# Patient Record
Sex: FEMALE | Race: WHITE | NOT HISPANIC OR LATINO | Employment: OTHER | URBAN - METROPOLITAN AREA
[De-identification: names, ages, dates, MRNs, and addresses within clinical notes are randomized per-mention and may not be internally consistent; named-entity substitution may affect disease eponyms.]

---

## 2017-10-28 ENCOUNTER — RADIANT APPOINTMENT (OUTPATIENT)
Dept: GENERAL RADIOLOGY | Facility: CLINIC | Age: 67
End: 2017-10-28
Attending: FAMILY MEDICINE
Payer: COMMERCIAL

## 2017-10-28 ENCOUNTER — OFFICE VISIT (OUTPATIENT)
Dept: URGENT CARE | Facility: URGENT CARE | Age: 67
End: 2017-10-28
Payer: COMMERCIAL

## 2017-10-28 VITALS
BODY MASS INDEX: 23.3 KG/M2 | DIASTOLIC BLOOD PRESSURE: 60 MMHG | TEMPERATURE: 98.7 F | HEART RATE: 73 BPM | RESPIRATION RATE: 16 BRPM | SYSTOLIC BLOOD PRESSURE: 130 MMHG | WEIGHT: 140 LBS

## 2017-10-28 DIAGNOSIS — L03.012 PARONYCHIA OF LEFT THUMB: Primary | ICD-10-CM

## 2017-10-28 DIAGNOSIS — L03.012 PARONYCHIA OF LEFT THUMB: ICD-10-CM

## 2017-10-28 DIAGNOSIS — E11.65 POORLY CONTROLLED TYPE 2 DIABETES MELLITUS (H): ICD-10-CM

## 2017-10-28 PROCEDURE — 99203 OFFICE O/P NEW LOW 30 MIN: CPT | Performed by: FAMILY MEDICINE

## 2017-10-28 PROCEDURE — 73140 X-RAY EXAM OF FINGER(S): CPT | Mod: LT

## 2017-10-28 RX ORDER — ECONAZOLE NITRATE 10 MG/G
CREAM TOPICAL 2 TIMES DAILY
Status: ON HOLD | COMMUNITY
End: 2020-01-23

## 2017-10-28 RX ORDER — PIOGLITAZONEHYDROCHLORIDE 15 MG/1
15 TABLET ORAL DAILY
COMMUNITY

## 2017-10-28 RX ORDER — CLINDAMYCIN HCL 300 MG
300 CAPSULE ORAL 4 TIMES DAILY
Qty: 40 CAPSULE | Refills: 0 | Status: ON HOLD | OUTPATIENT
Start: 2017-10-28 | End: 2020-01-23

## 2017-10-28 NOTE — MR AVS SNAPSHOT
After Visit Summary   10/28/2017    Carie Ambrocio    MRN: [id number]           Patient Information     Date Of Birth          1950        Visit Information        Provider Department      10/28/2017 11:15 AM Amaris Brown MD [facility]        Today's Diagnoses     Paronychia of left thumb    -  1      Care Instructions      Paronychia of the Finger or Toe  Paronychia is an infection near a fingernail or toenail. It usually occurs when an opening in the cuticle or an ingrown toenail lets bacteria under the skin.  The infection will need to be drained if pus is present. If the infection has been caught early, you may need only antibiotic treatment. Healing will take about 1 to 2 weeks.  Home care  Follow these guidelines when caring for yourself at home:    Clean and soak the toe or finger. Do this 2 times a day for the first 3 days. To do so:    Soak your foot or hand in a tub of warm water for 5 minutes. Or hold your toe or finger under a faucet of warm running water for 5 minutes.    Clean any crust away with soap and water using a cotton swab.    Put antibiotic ointment on the infected area.    Change the dressing daily or any time it gets dirty.    If you were given antibiotics, take them as directed until they are all gone.    If your infection is on a toe, wear comfortable shoes with a lot of toe room. You can also wear open-toed sandals while your toe heals.    You may use over-the-counter medicine (acetaminophen or ibuprofen to help with pain, unless another medicine was prescribed. If you have chronic liver or kidney disease, talk with your healthcare provider before using these medicines. Also talk with your provider if you've had a stomach ulcer or GI (gastrointestinal) bleeding.  Prevention  The following can prevent paronychia:    Avoid cutting or playing with your cuticles at home.    Don't bite your nails.    Don't suck on your thumbs or  "fingers.  Follow-up care  Follow up with your healthcare provider, or as advised.  When to seek medical advice  Call your healthcare provider right away if any of these occur:    Redness, pain, or swelling of the finger or toe gets worse    Red streaks in the skin leading away from the wound    Pus or fluid draining from the nail area    Fever of 100.4 F (38 C) or higher, or as directed by your provider  Date Last Reviewed: 8/1/2016 2000-2017 [employer]. [address] [place] [address]. All rights reserved. This information is not intended as a substitute for professional medical care. Always follow your healthcare professional's instructions.                Follow-ups after your visit        Your next 10 appointments already scheduled     Nov 17, 2017 12:30 PM CST   MA SCREENING BILATERAL W/ PATRICK with [id number]   [facility] ([facility])    [address], [address]  [facility] [address]   [phone]           Three-dimensional (3D) mammograms are available at [place] locations in [facility], [facility], [place], [place], and [place]. -Health locations include [facility] in [place]. Benefits of 3D mammograms include: - Improved rate of cancer detection - Decreases your chance of having to go back for more tests, which means fewer: - \"False-positive\" results (This means that there is an abnormal area but it isn't cancer.) - Invasive testing procedures, such as a biopsy or surgery - Can provide clearer images of the breast if you have dense breast tissue. 3D mammography is an optional exam that anyone can have with a 2D mammogram. It doesn't replace or take the place of a 2D mammogram. 2D mammograms remain an effective screening test for all women.  Not all insurance companies cover the cost of a 3D mammogram. Check with your insurance.              Who to contact     If you have " "questions or need follow up information about today's clinic visit or your schedule please contact [facility] directly at [phone].  Normal or non-critical lab and imaging results will be communicated to you by MyChart, letter or phone within 4 business days after the clinic has received the results. If you do not hear from us within 7 days, please contact the clinic through [employer] or phone. If you have a critical or abnormal lab result, we will notify you by phone as soon as possible.  Submit refill requests through [employer] or call your pharmacy and they will forward the refill request to us. Please allow 3 business days for your refill to be completed.          Additional Information About Your Visit        [employer] Information     [employer] lets you send messages to your doctor, view your test results, renew your prescriptions, schedule appointments and more. To sign up, go to [email or web address] . Click on \"Log in\" on the left side of the screen, which will take you to the Welcome page. Then click on \"Sign up Now\" on the right side of the page.     You will be asked to enter the access code listed below, as well as some personal information. Please follow the directions to create your username and password.     Your access code is: [email or web address]  Expires: 2018 12:37 PM     Your access code will  in 90 days. If you need help or a new code, please call your [place] clinic or [phone].        Care EveryWhere ID     This is your Care EveryWhere ID. This could be used by other organizations to access your [place] medical records  [id number]        Your Vitals Were     Pulse Temperature Respirations BMI (Body Mass Index)          73 98.7  F (37.1  C) 16 23.3 kg/m2         Blood Pressure from Last 3 Encounters:   10/28/17 130/60   16 128/65   16 152/80    Weight from Last 3 Encounters:   10/28/17 140 lb (63.5 kg)   14 138 lb (62.6 kg)               "   Today's Medication Changes          These changes are accurate as of: 10/28/17 12:37 PM.  If you have any questions, ask your nurse or doctor.               Start taking these medicines.        Dose/Directions    clindamycin 300 MG capsule   Commonly known as:  CLEOCIN   Used for:  Paronychia of left thumb   Started by:  Amaris Brown MD        Dose:  300 mg   Take 1 capsule (300 mg) by mouth 4 times daily   Quantity:  40 capsule   Refills:  0            Where to get your medicines      These medications were sent to [facility] [address] [facility] [address] [facility] [address] & [facility]  [address] [place] [address] [address], [facility] [address]    Hours:  24-hours Phone:  [phone]     clindamycin 300 MG capsule                Primary Care Provider Office Phone # Fax #    Adan Carty [phone]       [address] [address]        Equal Access to Services     [facility]: Hadii aad ku hadasho Soomaali, waaxda luqadaha, qaybta kaalmada adeegyada, waxay idiin hayaan lanette dinharalady woodard . So [facility] [phone].    ATENCIÓN: Si habla español, tiene a forte disposición servicios gratuitos de asistencia lingüística. [facility] [phone].    We comply with applicable federal civil rights laws and [place] laws. We do not discriminate on the basis of race, color, national origin, age, disability, sex, sexual orientation, or gender identity.            Thank you!     Thank you for choosing [facility]  for your care. Our goal is always to provide you with excellent care. Hearing back from our patients is one way we can continue to improve our services. Please take a few minutes to complete the written survey that you may receive in the mail after your visit with us. Thank you!             Your Updated Medication List - Protect others around you: Learn how to safely use, store and throw away your medicines at  www.disposemymeds.org.          This list is accurate as of: 10/28/17 12:37 PM.  Always use your most recent med list.                   Brand Name Dispense Instructions for use Diagnosis    aspirin 81 MG tablet      Take 1 tablet by mouth daily.        ATENOLOL PO           clindamycin 300 MG capsule    CLEOCIN    40 capsule    Take 1 capsule (300 mg) by mouth 4 times daily    Paronychia of left thumb       econazole nitrate 1 % cream      Apply topically 2 times daily        JANUVIA PO           METFORMIN HCL PO           pioglitazone 15 MG tablet    ACTOS     Take by mouth daily        PREMARIN PO           SIMVASTATIN PO      Take 20 mg by mouth At Bedtime        SYMBICORT IN

## 2017-10-28 NOTE — PATIENT INSTRUCTIONS
Paronychia of the Finger or Toe  Paronychia is an infection near a fingernail or toenail. It usually occurs when an opening in the cuticle or an ingrown toenail lets bacteria under the skin.  The infection will need to be drained if pus is present. If the infection has been caught early, you may need only antibiotic treatment. Healing will take about 1 to 2 weeks.  Home care  Follow these guidelines when caring for yourself at home:    Clean and soak the toe or finger. Do this 2 times a day for the first 3 days. To do so:    Soak your foot or hand in a tub of warm water for 5 minutes. Or hold your toe or finger under a faucet of warm running water for 5 minutes.    Clean any crust away with soap and water using a cotton swab.    Put antibiotic ointment on the infected area.    Change the dressing daily or any time it gets dirty.    If you were given antibiotics, take them as directed until they are all gone.    If your infection is on a toe, wear comfortable shoes with a lot of toe room. You can also wear open-toed sandals while your toe heals.    You may use over-the-counter medicine (acetaminophen or ibuprofen to help with pain, unless another medicine was prescribed. If you have chronic liver or kidney disease, talk with your healthcare provider before using these medicines. Also talk with your provider if you've had a stomach ulcer or GI (gastrointestinal) bleeding.  Prevention  The following can prevent paronychia:    Avoid cutting or playing with your cuticles at home.    Don't bite your nails.    Don't suck on your thumbs or fingers.  Follow-up care  Follow up with your healthcare provider, or as advised.  When to seek medical advice  Call your healthcare provider right away if any of these occur:    Redness, pain, or swelling of the finger or toe gets worse    Red streaks in the skin leading away from the wound    Pus or fluid draining from the nail area    Fever of 100.4 F (38 C) or higher, or as directed  by your provider  Date Last Reviewed: 8/1/2016 2000-2017 [employer]. [address] [place] [address], [place] [address]. All rights reserved. This information is not intended as a substitute for professional medical care. Always follow your healthcare professional's instructions.

## 2017-10-28 NOTE — PROGRESS NOTES
SUBJECTIVE:  Chief Complaint   Patient presents with     Urgent Care     Pt got a manicure on 9/21/17 and got an infection on her left thumb. Pt has seen multiple doctors and has been on two rounds of antibiotics. Pt continues to have pain in her thumb intermittently.     Carie Ambrocio is a 67 year old female who presents complaining of left   hand  first digit pain.  She had a manicure 9/21/17 with an infection that resulted around the nail margin.  She was seen by dermatology and treated with Bactrim 5 days with some improvement.  She followed up with primary care and was given 10 days of bactrim with improvement but not complete resolution,  Was also given antifungal cream to apply to the finger.  She has noted some persistent  redness and swelling along the cuticle margin with local tenderness.  Symptoms began    Severity: mild.  Her dermatologist suggested that the infection may not resolve if there is osteomyelitis-  She would like x-ray to evaluate.   No fevers or chills noted.  No migration of redness or swelling proximally.    Past Medical History:   Diagnosis Date     Type 2 diabetes mellitus without complications (H)      Uncomplicated asthma        ALLERGIES:  Levaquin [levofloxacin] and Penicillins      Current Outpatient Prescriptions on File Prior to Visit:  SIMVASTATIN PO Take 20 mg by mouth At Bedtime   ATENOLOL PO    METFORMIN HCL PO    SitaGLIPtin Phosphate (JANUVIA PO)    Budesonide-Formoterol Fumarate (SYMBICORT IN)    Estrogens Conjugated (PREMARIN PO)    aspirin 81 MG tablet Take 1 tablet by mouth daily.     No current facility-administered medications on file prior to visit.     Social History   Substance Use Topics     Smoking status: Never Smoker     Smokeless tobacco: Not on file     Alcohol use 2.4 oz/week     4 Shots of liquor per week       Family History   Problem Relation Age of Onset     CANCER Father      skin cancder       ROS:  CONSTITUTIONAL:NEGATIVE for fever, chills,  change in weight  EYES: NEGATIVE for vision changes or irritation  ENT/MOUTH: NEGATIVE for ear, mouth and throat problems  RESP:NEGATIVE for significant cough or SOB  GI: NEGATIVE for nausea, abdominal pain, heartburn, or change in bowel habits    OBJECTIVE:  /60  Pulse 73  Temp 98.7  F (37.1  C)  Resp 16  Wt 140 lb (63.5 kg)  BMI 23.3 kg/m2  left Hand  exam:  examination of first digit reveals paronychia with redness, tenderness and swelling along the distal digit at the nail margin.      GENERAL APPEARANCE: healthy, alert and no distress  MS:extremities normal- no gross deformities noted,  FROM noted in all extremities  NEURO: Normal strength and tone, sensory exam grossly normal,  normal speech and mentation  SKIN: no suspicious lesions or rashes      ASSESSMENT:  Paronychia of left thumb     - XR Finger Left G/E 2 Views; Future  - clindamycin (CLEOCIN) 300 MG capsule; Take 1 capsule (300 mg) by mouth 4 times daily       For home care the patient was advised to perform warm water soaks twice daily.    Use peroxide and a cotton swab to clean the creases around the fingernail   Patient was advised that future infections may resolve without antibiotics with good cleaning of the groove at the margin of the nail and with drainage of any purulent material,  pulling the adjacent skin away from the nail.    She should continue the local application of the antifungal cream    Poorly controlled type 2 diabetes mellitus (H)      discussed that he high blood sugars make her ability to fight the infection weaker

## 2017-10-28 NOTE — NURSING NOTE
"Chief Complaint   Patient presents with     Urgent Care     Pt got a manicure on 9/21/17 and got an infection on her left thumb. Pt has seen multiple doctors and has been on two rounds of antibiotics. Pt continues to have pain in her thumb intermittently.       Initial /60  Pulse 73  Temp 98.7  F (37.1  C)  Resp 16  Wt 140 lb (63.5 kg)  BMI 23.3 kg/m2 Estimated body mass index is 23.3 kg/(m^2) as calculated from the following:    Height as of 1/19/14: 5' 5\" (1.651 m).    Weight as of this encounter: 140 lb (63.5 kg).  Medication Reconciliation: complete    "

## 2017-11-17 ENCOUNTER — HOSPITAL ENCOUNTER (OUTPATIENT)
Dept: MAMMOGRAPHY | Facility: CLINIC | Age: 67
Discharge: HOME | End: 2017-11-17
Attending: FAMILY MEDICINE | Admitting: FAMILY MEDICINE
Payer: MEDICARE

## 2017-11-17 DIAGNOSIS — Z12.31 VISIT FOR SCREENING MAMMOGRAM: ICD-10-CM

## 2017-11-17 PROCEDURE — 77063 BREAST TOMOSYNTHESIS BI: CPT

## 2018-04-30 ENCOUNTER — OFFICE VISIT (OUTPATIENT)
Dept: UROLOGY | Facility: CLINIC | Age: 68
End: 2018-04-30
Payer: COMMERCIAL

## 2018-04-30 VITALS — BODY MASS INDEX: 23.32 KG/M2 | HEIGHT: 65 IN | WEIGHT: 140 LBS | OXYGEN SATURATION: 98 % | HEART RATE: 74 BPM

## 2018-04-30 DIAGNOSIS — Z87.442 HISTORY OF KIDNEY STONES: Primary | ICD-10-CM

## 2018-04-30 LAB
ALBUMIN UR-MCNC: 30 MG/DL
APPEARANCE UR: CLEAR
BILIRUB UR QL STRIP: NEGATIVE
COLOR UR AUTO: YELLOW
GLUCOSE UR STRIP-MCNC: 500 MG/DL
HGB UR QL STRIP: ABNORMAL
KETONES UR STRIP-MCNC: ABNORMAL MG/DL
LEUKOCYTE ESTERASE UR QL STRIP: NEGATIVE
NITRATE UR QL: NEGATIVE
PH UR STRIP: 5 PH (ref 5–7)
SOURCE: ABNORMAL
SP GR UR STRIP: >1.03 (ref 1–1.03)
UROBILINOGEN UR STRIP-ACNC: 0.2 EU/DL (ref 0.2–1)

## 2018-04-30 PROCEDURE — 99202 OFFICE O/P NEW SF 15 MIN: CPT | Performed by: UROLOGY

## 2018-04-30 PROCEDURE — 81003 URINALYSIS AUTO W/O SCOPE: CPT | Mod: QW | Performed by: UROLOGY

## 2018-04-30 ASSESSMENT — PAIN SCALES - GENERAL: PAINLEVEL: NO PAIN (0)

## 2018-04-30 NOTE — MR AVS SNAPSHOT
"              After Visit Summary   2018    Carie Ambrocio    MRN: [id number]           Patient Information     Date Of Birth          1950        Visit Information        Provider Department      2018 11:30 AM Larry Dean MD [facility]        Today's Diagnoses     History of kidney stones    -  1       Follow-ups after your visit        Follow-up notes from your care team     Return if symptoms worsen or fail to improve.      Who to contact     If you have questions or need follow up information about today's clinic visit or your schedule please contact [facility] [place] directly at [phone].  Normal or non-critical lab and imaging results will be communicated to you by MyChart, letter or phone within 4 business days after the clinic has received the results. If you do not hear from us within 7 days, please contact the clinic through MyChart or phone. If you have a critical or abnormal lab result, we will notify you by phone as soon as possible.  Submit refill requests through [employer] or call your pharmacy and they will forward the refill request to us. Please allow 3 business days for your refill to be completed.          Additional Information About Your Visit        MyChart Information     [employer] lets you send messages to your doctor, view your test results, renew your prescriptions, schedule appointments and more. To sign up, go to [email or web address] . Click on \"Log in\" on the left side of the screen, which will take you to the Welcome page. Then click on \"Sign up Now\" on the right side of the page.     You will be asked to enter the access code listed below, as well as some personal information. Please follow the directions to create your username and password.     Your access code is: [email or web address]  Expires: 2018 11:56 AM     Your access code will  in 90 days. If you need " "help or a new code, please call your [facility] or [phone].        Care EveryWhere ID     This is your Care EveryWhere ID. This could be used by other organizations to access your [place] medical records  [id number]        Your Vitals Were     Pulse Height Pulse Oximetry BMI (Body Mass Index)          74 1.651 m (5' 5\") 98% 23.3 kg/m2         Blood Pressure from Last 3 Encounters:   10/28/17 130/60   05/28/16 128/65   01/31/16 152/80    Weight from Last 3 Encounters:   04/30/18 63.5 kg (140 lb)   10/28/17 63.5 kg (140 lb)   01/19/14 62.6 kg (138 lb)              We Performed the Following     UA without Microscopic        Primary Care Provider Office Phone # Fax #    Adan Carty [phone]       [facility] [address] [place] [address]        Equal Access to Services     [facility]: Hadii aad ku hadasho Soomaali, waaxda luqadaha, qaybta kaalmada adeegyada, waxay idiin haytavonn lanette woodard . So [facility] [phone].    ATENCIÓN: Si habla español, tiene a forte disposición servicios gratuitos de asistencia lingüística. Llame al [phone].    We comply with applicable federal civil rights laws and [place] laws. We do not discriminate on the basis of race, color, national origin, age, disability, sex, sexual orientation, or gender identity.            Thank you!     Thank you for choosing [facility] [place]  for your care. Our goal is always to provide you with excellent care. Hearing back from our patients is one way we can continue to improve our services. Please take a few minutes to complete the written survey that you may receive in the mail after your visit with us. Thank you!             Your Updated Medication List - Protect others around you: Learn how to safely use, store and throw away your medicines at www.disposemymeds.org.          This list is accurate as of 4/30/18 11:56 AM.  Always use your " most recent med list.                   Brand Name Dispense Instructions for use Diagnosis    aspirin 81 MG tablet      Take 1 tablet by mouth daily.        ATENOLOL PO           clindamycin 300 MG capsule    CLEOCIN    40 capsule    Take 1 capsule (300 mg) by mouth 4 times daily    Paronychia of left thumb       econazole nitrate 1 % cream      Apply topically 2 times daily        JANUMET  MG per tablet   Generic drug:  sitagliptin-metFORMIN      Take 1 tablet by mouth daily        JANUVIA PO           METFORMIN HCL PO           pioglitazone 15 MG tablet    ACTOS     Take by mouth daily        PREMARIN PO           SIMVASTATIN PO      Take 20 mg by mouth At Bedtime        SYMBICORT IN

## 2018-04-30 NOTE — NURSING NOTE
[facility] [place]  KUB is in care everywhere.  Pt has crystals in her urine.  Pt states her urine in the past has smelt like metal.  Pt wants to talk to RJ Mckinley, CMA

## 2018-04-30 NOTE — PROGRESS NOTES
[facility]  Main Office: [address]  [address]  [place] [address]       CHIEF COMPLAINT:   History of kidney stones and hyperdense left renal cyst    HISTORY:   This is a 67-year-old woman who is establishing care today because she has a history of kidney stones. She has had 2 kidney stones that she was able to pass. It happens 25 and 20 years ago. She has had no problems over the past 20 years. She has had no prior urologic surgeries, both of her stones passed. She did have a CT scan performed in 2016 that showed a hyperdense renal cyst on the left side, a follow-up MRI was performed at [facility] that showed no concerns for a solid renal mass. Her most recent imaging was last month when she had a KUB also at [facility]. The KUB was negative showing no evidence of recurrent stones.      PAST MEDICAL HISTORY:   Past Medical History:   Diagnosis Date     Type 2 diabetes mellitus without complications (H)      Uncomplicated asthma        PAST SURGICAL HISTORY:   No past surgical history on file.    FAMILY HISTORY:   Family History   Problem Relation Age of Onset     CANCER Father      skin cancder       SOCIAL HISTORY:   Social History   Substance Use Topics     Smoking status: Never Smoker     Smokeless tobacco: Not on file     Alcohol use 2.4 oz/week     4 Shots of liquor per week        ALLERGIES:  Allergies   Allergen Reactions     Levaquin [Levofloxacin]      Penicillins        MEDICATIONS:     Current Outpatient Prescriptions:      aspirin 81 MG tablet, Take 1 tablet by mouth daily., Disp: , Rfl: 3     ATENOLOL PO, , Disp: , Rfl:      Budesonide-Formoterol Fumarate (SYMBICORT IN), , Disp: , Rfl:      clindamycin (CLEOCIN) 300 MG capsule, Take 1 capsule (300 mg) by mouth 4 times daily, Disp: 40 capsule, Rfl: 0     econazole nitrate 1 % cream, Apply topically 2 times daily, Disp: , Rfl:      Estrogens Conjugated (PREMARIN PO), , Disp: , Rfl:      METFORMIN HCL PO, , Disp: , Rfl:      pioglitazone  (ACTOS) 15 MG tablet, Take by mouth daily, Disp: , Rfl:      SIMVASTATIN PO, Take 20 mg by mouth At Bedtime, Disp: , Rfl:      SitaGLIPtin Phosphate (JANUVIA PO), , Disp: , Rfl:     REVIEW OF SYSTEMS:  Allergic/Immunologic: negative  Constitutional Symptoms: negative   Fever: none   Weight loss: none   Other: none  Hematologic/Lymphatic: negative  Integumentary: negative   Breast: negative   Hair: negative   Skin: negative   Skin: negative  Eyes: negative  Ears/Nose/Throat: negative  Respiratory: negative  Cardiovascular: negative  Gastrointestinal: negative  Genitourinary: negative  Musculoskeletal: negative  Neurologic: negative  Psychiatric: negative  Reproductive System: negative  Endocrine: negative      PHYSICAL EXAM:  VS: HR: Data Unavailable    BP: Data Unavailable      WT: 0 lbs 0 oz       HT: Data Unavailable    General appearance: In NAD, conversant  HEENT: normocephalic and atraumatic, anicteric sclera  Lymph Nodes: not examined  Cardiovascular: not examined  Respiratory: normal, non-labored breathing  Abdomen: soft, non-tender, and non-distended  Back/Flank: not examined  Peripheral Vascular/extremity: no peripheral edema  Lymphatic: not examined  Skin: Normal temperature, turgor, and texture. No rash  Psychiatric: Appropriate affect. Alert and Oriented to person, place, and time    Pelvic:    External genitalia: not examined   Urethra: not examined   Vagina: not examined      Cystoscopy:     Laboratory Studies:     Imaging Studies:       CLINICAL IMPRESSION:   History of kidney stones    PLAN:   We discussed prevention of future stones. All of her questions were answered. A recent KUB showed no evidence of stone so no current intervention is required. She will follow-up with me as needed future.      Larry Dean M.D.

## 2018-11-21 ENCOUNTER — HOSPITAL ENCOUNTER (OUTPATIENT)
Dept: MAMMOGRAPHY | Facility: CLINIC | Age: 68
Discharge: HOME | End: 2018-11-21
Attending: OBSTETRICS & GYNECOLOGY | Admitting: OBSTETRICS & GYNECOLOGY
Payer: MEDICARE

## 2018-11-21 ENCOUNTER — HOSPITAL ENCOUNTER (OUTPATIENT)
Dept: MAMMOGRAPHY | Facility: CLINIC | Age: 68
End: 2018-11-21
Attending: OBSTETRICS & GYNECOLOGY
Payer: MEDICARE

## 2018-11-21 DIAGNOSIS — R92.8 ABNORMAL MAMMOGRAM: ICD-10-CM

## 2018-11-21 PROCEDURE — 77066 DX MAMMO INCL CAD BI: CPT

## 2018-11-21 PROCEDURE — 76642 ULTRASOUND BREAST LIMITED: CPT | Mod: RT

## 2018-12-27 NOTE — LETTER
4/30/2018       RE: Carie Ambrocio  [address] [place] [address]     Dear Colleague,    Thank you for referring your patient, Carie Ambrocio, to the [facility] [place] at [facility]. Please see a copy of my visit note below.    [facility]  Main Office: [address]  [address]  [place] [address]       CHIEF COMPLAINT:   History of kidney stones and hyperdense left renal cyst    HISTORY:   This is a 67-year-old woman who is establishing care today because she has a history of kidney stones. She has had 2 kidney stones that she was able to pass. It happens 25 and 20 years ago. She has had no problems over the past 20 years. She has had no prior urologic surgeries, both of her stones passed. She did have a CT scan performed in 2016 that showed a hyperdense renal cyst on the left side, a follow-up MRI was performed at [facility] that showed no concerns for a solid renal mass. Her most recent imaging was last month when she had a KUB also at [facility]. The KUB was negative showing no evidence of recurrent stones.      PAST MEDICAL HISTORY:   Past Medical History:   Diagnosis Date     Type 2 diabetes mellitus without complications (H)      Uncomplicated asthma        PAST SURGICAL HISTORY:   No past surgical history on file.    FAMILY HISTORY:   Family History   Problem Relation Age of Onset     CANCER Father      skin cancder       SOCIAL HISTORY:   Social History   Substance Use Topics     Smoking status: Never Smoker     Smokeless tobacco: Not on file     Alcohol use 2.4 oz/week     4 Shots of liquor per week        ALLERGIES:  Allergies   Allergen Reactions     Levaquin [Levofloxacin]      Penicillins        MEDICATIONS:     Current Outpatient Prescriptions:      aspirin 81 MG tablet, Take 1 tablet by mouth daily., Disp: , Rfl: 3     ATENOLOL PO, , Disp: , Rfl:      Budesonide-Formoterol Fumarate (SYMBICORT IN), ,  Patient called c/o cough, congestion x 5 days. Patient has tried muccinex and that thinned the mucous out but cough persists (worse at night). Patient requesting appt to be seen or prescription for cough.       [facility] [address] [place] [address] -  [phone] [[email or web address] [id number]]   [phone] Disp: , Rfl:      clindamycin (CLEOCIN) 300 MG capsule, Take 1 capsule (300 mg) by mouth 4 times daily, Disp: 40 capsule, Rfl: 0     econazole nitrate 1 % cream, Apply topically 2 times daily, Disp: , Rfl:      Estrogens Conjugated (PREMARIN PO), , Disp: , Rfl:      METFORMIN HCL PO, , Disp: , Rfl:      pioglitazone (ACTOS) 15 MG tablet, Take by mouth daily, Disp: , Rfl:      SIMVASTATIN PO, Take 20 mg by mouth At Bedtime, Disp: , Rfl:      SitaGLIPtin Phosphate (JANUVIA PO), , Disp: , Rfl:     REVIEW OF SYSTEMS:  Allergic/Immunologic: negative  Constitutional Symptoms: negative   Fever: none   Weight loss: none   Other: none  Hematologic/Lymphatic: negative  Integumentary: negative   Breast: negative   Hair: negative   Skin: negative   Skin: negative  Eyes: negative  Ears/Nose/Throat: negative  Respiratory: negative  Cardiovascular: negative  Gastrointestinal: negative  Genitourinary: negative  Musculoskeletal: negative  Neurologic: negative  Psychiatric: negative  Reproductive System: negative  Endocrine: negative      PHYSICAL EXAM:  VS: HR: Data Unavailable    BP: Data Unavailable      WT: 0 lbs 0 oz       HT: Data Unavailable    General appearance: In NAD, conversant  HEENT: normocephalic and atraumatic, anicteric sclera  Lymph Nodes: not examined  Cardiovascular: not examined  Respiratory: normal, non-labored breathing  Abdomen: soft, non-tender, and non-distended  Back/Flank: not examined  Peripheral Vascular/extremity: no peripheral edema  Lymphatic: not examined  Skin: Normal temperature, turgor, and texture. No rash  Psychiatric: Appropriate affect. Alert and Oriented to person, place, and time    Pelvic:    External genitalia: not examined   Urethra: not examined   Vagina: not examined      Cystoscopy:     Laboratory Studies:     Imaging Studies:       CLINICAL IMPRESSION:   History of kidney stones    PLAN:   We discussed prevention of future stones. All of her questions were answered. A recent KUB  showed no evidence of stone so no current intervention is required. She will follow-up with me as needed future.      Again, thank you for allowing me to participate in the care of your patient.      Sincerely,    Larry Dean MD

## 2019-06-08 ENCOUNTER — OFFICE VISIT (OUTPATIENT)
Dept: URGENT CARE | Facility: URGENT CARE | Age: 69
End: 2019-06-08
Payer: COMMERCIAL

## 2019-06-08 VITALS
SYSTOLIC BLOOD PRESSURE: 130 MMHG | HEIGHT: 65 IN | WEIGHT: 141 LBS | TEMPERATURE: 98.1 F | DIASTOLIC BLOOD PRESSURE: 70 MMHG | HEART RATE: 88 BPM | RESPIRATION RATE: 16 BRPM | BODY MASS INDEX: 23.49 KG/M2

## 2019-06-08 DIAGNOSIS — R30.0 DYSURIA: Primary | ICD-10-CM

## 2019-06-08 LAB
ALBUMIN UR-MCNC: 30 MG/DL
APPEARANCE UR: CLEAR
BACTERIA #/AREA URNS HPF: ABNORMAL /HPF
BILIRUB UR QL STRIP: ABNORMAL
CAOX CRY #/AREA URNS HPF: ABNORMAL /HPF
COLOR UR AUTO: YELLOW
GLUCOSE UR STRIP-MCNC: 250 MG/DL
HGB UR QL STRIP: NEGATIVE
KETONES UR STRIP-MCNC: ABNORMAL MG/DL
LEUKOCYTE ESTERASE UR QL STRIP: NEGATIVE
NITRATE UR QL: NEGATIVE
NON-SQ EPI CELLS #/AREA URNS LPF: ABNORMAL /LPF
PH UR STRIP: 5.5 PH (ref 5–7)
RBC #/AREA URNS AUTO: ABNORMAL /HPF
SOURCE: ABNORMAL
SP GR UR STRIP: >1.03 (ref 1–1.03)
UROBILINOGEN UR STRIP-ACNC: 0.2 EU/DL (ref 0.2–1)
WBC #/AREA URNS AUTO: ABNORMAL /HPF

## 2019-06-08 PROCEDURE — 99213 OFFICE O/P EST LOW 20 MIN: CPT | Performed by: PEDIATRICS

## 2019-06-08 PROCEDURE — 87086 URINE CULTURE/COLONY COUNT: CPT | Performed by: PEDIATRICS

## 2019-06-08 PROCEDURE — 81001 URINALYSIS AUTO W/SCOPE: CPT | Performed by: PEDIATRICS

## 2019-06-08 RX ORDER — PHENAZOPYRIDINE HYDROCHLORIDE 100 MG/1
100 TABLET, FILM COATED ORAL 3 TIMES DAILY PRN
Qty: 9 TABLET | Refills: 0 | Status: SHIPPED | OUTPATIENT
Start: 2019-06-08 | End: 2019-06-11

## 2019-06-08 ASSESSMENT — MIFFLIN-ST. JEOR: SCORE: 1165.45

## 2019-06-08 NOTE — PROGRESS NOTES
"SUBJECTIVE:   Carie Ambrocio is a 69 year old female who  presents today for a possible UTI. Symptoms of urgency, frequency and burning have been going on for 2hour(s).  Hematuria no.  sudden onsetand mild.  There is no history of fever, chills, nausea or vomiting.  No history of vaginal or penile discharge. This patient does have a history of urinary tract infections. Patient denies flank pain, temperature > 101 degrees F. and Vomiting, significant nausea or diarrhea or vaginal discharge     Past Medical History:   Diagnosis Date     Mumps      Spider veins      Type 2 diabetes mellitus without complications (H)      Uncomplicated asthma      Current Outpatient Medications   Medication Sig Dispense Refill     aspirin 81 MG tablet Take 1 tablet by mouth daily.  3     ATENOLOL PO        Budesonide-Formoterol Fumarate (SYMBICORT IN)        Estrogens Conjugated (PREMARIN PO)        phenazopyridine (PYRIDIUM) 100 MG tablet Take 1 tablet (100 mg) by mouth 3 times daily as needed for urinary tract discomfort 9 tablet 0     pioglitazone (ACTOS) 15 MG tablet Take by mouth daily       SIMVASTATIN PO Take 20 mg by mouth At Bedtime       sitagliptin-metFORMIN (JANUMET)  MG per tablet Take 1 tablet by mouth daily       clindamycin (CLEOCIN) 300 MG capsule Take 1 capsule (300 mg) by mouth 4 times daily (Patient not taking: Reported on 4/30/2018) 40 capsule 0     econazole nitrate 1 % cream Apply topically 2 times daily       METFORMIN HCL PO        SitaGLIPtin Phosphate (JANUVIA PO)        Social History     Tobacco Use     Smoking status: Never Smoker     Smokeless tobacco: Never Used   Substance Use Topics     Alcohol use: Yes     Alcohol/week: 2.4 oz     Types: 4 Shots of liquor per week       ROS:   Review of systems negative except as stated above.    OBJECTIVE:  /70 (Cuff Size: Adult Regular)   Pulse 88   Temp 98.1  F (36.7  C) (Oral)   Resp 16   Ht 1.651 m (5' 5\")   Wt 64 kg (141 lb)   BMI 23.46 " kg/m    GENERAL APPEARANCE: healthy, alert and no distress  RESP: lungs clear to auscultation - no rales, rhonchi or wheezes  CV: regular rates and rhythm, normal S1 S2, no murmur noted  BACK: No CVA tenderness  SKIN: no suspicious lesions or rashes    ASSESSMENT:   Dysuria. UA negative. Ucx added on.    PLAN:  As per ordered above  Drink plenty of fluids.  Prevention and treatment of UTI's discussed.Signs and symptoms of pyelonephritis mentioned.  Given meds for symptom management. Explained to patient that her culture results will help guide further management. If still having symptoms and culture negative, may need a swab to check for yeast (given high glucose in the urine).    Moonlighter

## 2019-06-11 LAB
BACTERIA SPEC CULT: NORMAL
SPECIMEN SOURCE: NORMAL

## 2019-11-21 ENCOUNTER — HOSPITAL ENCOUNTER (OUTPATIENT)
Dept: MAMMOGRAPHY | Facility: CLINIC | Age: 69
Discharge: HOME | End: 2019-11-21
Attending: OBSTETRICS & GYNECOLOGY | Admitting: OBSTETRICS & GYNECOLOGY
Payer: COMMERCIAL

## 2019-11-21 DIAGNOSIS — Z12.31 VISIT FOR SCREENING MAMMOGRAM: ICD-10-CM

## 2019-11-21 PROCEDURE — 77063 BREAST TOMOSYNTHESIS BI: CPT

## 2019-12-11 ENCOUNTER — ANCILLARY PROCEDURE (OUTPATIENT)
Dept: BONE DENSITY | Facility: CLINIC | Age: 69
End: 2019-12-11
Attending: FAMILY MEDICINE
Payer: COMMERCIAL

## 2019-12-11 DIAGNOSIS — M81.0 OSTEOPOROSIS, UNSPECIFIED OSTEOPOROSIS TYPE, UNSPECIFIED PATHOLOGICAL FRACTURE PRESENCE: ICD-10-CM

## 2019-12-11 PROCEDURE — 77080 DXA BONE DENSITY AXIAL: CPT | Performed by: INTERNAL MEDICINE

## 2020-01-22 RX ORDER — SUMATRIPTAN 100 MG/1
100 TABLET, FILM COATED ORAL
COMMUNITY

## 2020-01-22 RX ORDER — GUAIFENESIN AND DEXTROMETHORPHAN HYDROBROMIDE 100; 10 MG/5ML; MG/5ML
10 SOLUTION ORAL EVERY 4 HOURS PRN
Status: ON HOLD | COMMUNITY
End: 2020-01-23

## 2020-01-22 RX ORDER — ALBUTEROL SULFATE 90 UG/1
2 AEROSOL, METERED RESPIRATORY (INHALATION) 4 TIMES DAILY PRN
COMMUNITY

## 2020-01-22 RX ORDER — VALACYCLOVIR HYDROCHLORIDE 500 MG/1
1000 TABLET, FILM COATED ORAL 2 TIMES DAILY
COMMUNITY

## 2020-01-22 RX ORDER — PSEUDOEPHEDRINE HCL 30 MG
1 TABLET ORAL DAILY
COMMUNITY

## 2020-01-22 RX ORDER — BIMATOPROST 3 UG/ML
1 SOLUTION TOPICAL EVERY OTHER DAY
COMMUNITY

## 2020-01-22 RX ORDER — CELECOXIB 100 MG/1
100 CAPSULE ORAL 2 TIMES DAILY
Status: ON HOLD | COMMUNITY
End: 2020-01-23

## 2020-01-22 RX ORDER — INSULIN LISPRO 100 [IU]/ML
1-3 INJECTION, SOLUTION INTRAVENOUS; SUBCUTANEOUS 2 TIMES DAILY PRN
COMMUNITY

## 2020-01-23 ENCOUNTER — HOSPITAL ENCOUNTER (OUTPATIENT)
Facility: CLINIC | Age: 70
Discharge: HOME | End: 2020-01-23
Attending: OPHTHALMOLOGY | Admitting: OPHTHALMOLOGY
Payer: COMMERCIAL

## 2020-01-23 ENCOUNTER — ANESTHESIA EVENT (OUTPATIENT)
Dept: SURGERY | Facility: CLINIC | Age: 70
End: 2020-01-23
Payer: COMMERCIAL

## 2020-01-23 ENCOUNTER — ANESTHESIA (OUTPATIENT)
Dept: SURGERY | Facility: CLINIC | Age: 70
End: 2020-01-23
Payer: COMMERCIAL

## 2020-01-23 VITALS
OXYGEN SATURATION: 97 % | BODY MASS INDEX: 24.31 KG/M2 | DIASTOLIC BLOOD PRESSURE: 69 MMHG | HEIGHT: 64 IN | SYSTOLIC BLOOD PRESSURE: 141 MMHG | RESPIRATION RATE: 12 BRPM | HEART RATE: 68 BPM | WEIGHT: 142.4 LBS | TEMPERATURE: 97.6 F

## 2020-01-23 DIAGNOSIS — Z98.890 POSTOPERATIVE EYE STATE: Primary | ICD-10-CM

## 2020-01-23 LAB
GLUCOSE BLDC GLUCOMTR-MCNC: 143 MG/DL (ref 70–99)
GLUCOSE BLDC GLUCOMTR-MCNC: 149 MG/DL (ref 70–99)

## 2020-01-23 PROCEDURE — 27210794 ZZH OR GENERAL SUPPLY STERILE: Performed by: OPHTHALMOLOGY

## 2020-01-23 PROCEDURE — 25000125 ZZHC RX 250: Performed by: OPHTHALMOLOGY

## 2020-01-23 PROCEDURE — 40000170 ZZH STATISTIC PRE-PROCEDURE ASSESSMENT II: Performed by: OPHTHALMOLOGY

## 2020-01-23 PROCEDURE — 36000056 ZZH SURGERY LEVEL 3 1ST 30 MIN: Performed by: OPHTHALMOLOGY

## 2020-01-23 PROCEDURE — 25000125 ZZHC RX 250: Performed by: NURSE ANESTHETIST, CERTIFIED REGISTERED

## 2020-01-23 PROCEDURE — 37000008 ZZH ANESTHESIA TECHNICAL FEE, 1ST 30 MIN: Performed by: OPHTHALMOLOGY

## 2020-01-23 PROCEDURE — 71000012 ZZH RECOVERY PHASE 1 LEVEL 1 FIRST HR: Performed by: OPHTHALMOLOGY

## 2020-01-23 PROCEDURE — 25000128 H RX IP 250 OP 636: Performed by: NURSE ANESTHETIST, CERTIFIED REGISTERED

## 2020-01-23 PROCEDURE — 25800030 ZZH RX IP 258 OP 636: Performed by: NURSE ANESTHETIST, CERTIFIED REGISTERED

## 2020-01-23 PROCEDURE — 71000027 ZZH RECOVERY PHASE 2 EACH 15 MINS: Performed by: OPHTHALMOLOGY

## 2020-01-23 PROCEDURE — 37000009 ZZH ANESTHESIA TECHNICAL FEE, EACH ADDTL 15 MIN: Performed by: OPHTHALMOLOGY

## 2020-01-23 PROCEDURE — 82962 GLUCOSE BLOOD TEST: CPT

## 2020-01-23 RX ORDER — ERYTHROMYCIN 5 MG/G
OINTMENT OPHTHALMIC
Status: DISCONTINUED
Start: 2020-01-23 | End: 2020-01-23 | Stop reason: HOSPADM

## 2020-01-23 RX ORDER — NALOXONE HYDROCHLORIDE 0.4 MG/ML
.1-.4 INJECTION, SOLUTION INTRAMUSCULAR; INTRAVENOUS; SUBCUTANEOUS
Status: DISCONTINUED | OUTPATIENT
Start: 2020-01-23 | End: 2020-01-23 | Stop reason: HOSPADM

## 2020-01-23 RX ORDER — OXYCODONE HYDROCHLORIDE 5 MG/1
5 TABLET ORAL EVERY 4 HOURS PRN
Qty: 6 TABLET | Refills: 0 | Status: SHIPPED | OUTPATIENT
Start: 2020-01-23

## 2020-01-23 RX ORDER — ONDANSETRON 2 MG/ML
4 INJECTION INTRAMUSCULAR; INTRAVENOUS EVERY 30 MIN PRN
Status: DISCONTINUED | OUTPATIENT
Start: 2020-01-23 | End: 2020-01-23 | Stop reason: HOSPADM

## 2020-01-23 RX ORDER — SODIUM CHLORIDE, SODIUM LACTATE, POTASSIUM CHLORIDE, CALCIUM CHLORIDE 600; 310; 30; 20 MG/100ML; MG/100ML; MG/100ML; MG/100ML
INJECTION, SOLUTION INTRAVENOUS CONTINUOUS PRN
Status: DISCONTINUED | OUTPATIENT
Start: 2020-01-23 | End: 2020-01-23

## 2020-01-23 RX ORDER — PROPOFOL 10 MG/ML
INJECTION, EMULSION INTRAVENOUS PRN
Status: DISCONTINUED | OUTPATIENT
Start: 2020-01-23 | End: 2020-01-23

## 2020-01-23 RX ORDER — ESTRADIOL 0.05 MG/D
1 PATCH, EXTENDED RELEASE TRANSDERMAL WEEKLY
COMMUNITY

## 2020-01-23 RX ORDER — ERYTHROMYCIN 5 MG/G
OINTMENT OPHTHALMIC
Qty: 3.5 G | Refills: 0 | Status: SHIPPED | OUTPATIENT
Start: 2020-01-23

## 2020-01-23 RX ORDER — LIDOCAINE HYDROCHLORIDE AND EPINEPHRINE BITARTRATE 20; .01 MG/ML; MG/ML
INJECTION, SOLUTION SUBCUTANEOUS
Status: DISCONTINUED
Start: 2020-01-23 | End: 2020-01-23 | Stop reason: HOSPADM

## 2020-01-23 RX ORDER — ONDANSETRON 2 MG/ML
INJECTION INTRAMUSCULAR; INTRAVENOUS PRN
Status: DISCONTINUED | OUTPATIENT
Start: 2020-01-23 | End: 2020-01-23

## 2020-01-23 RX ORDER — TETRACAINE HYDROCHLORIDE 5 MG/ML
SOLUTION OPHTHALMIC PRN
Status: DISCONTINUED | OUTPATIENT
Start: 2020-01-23 | End: 2020-01-23 | Stop reason: HOSPADM

## 2020-01-23 RX ORDER — MEPERIDINE HYDROCHLORIDE 25 MG/ML
12.5 INJECTION INTRAMUSCULAR; INTRAVENOUS; SUBCUTANEOUS
Status: DISCONTINUED | OUTPATIENT
Start: 2020-01-23 | End: 2020-01-23 | Stop reason: HOSPADM

## 2020-01-23 RX ORDER — HYDROMORPHONE HYDROCHLORIDE 1 MG/ML
.3-.5 INJECTION, SOLUTION INTRAMUSCULAR; INTRAVENOUS; SUBCUTANEOUS EVERY 10 MIN PRN
Status: DISCONTINUED | OUTPATIENT
Start: 2020-01-23 | End: 2020-01-23 | Stop reason: HOSPADM

## 2020-01-23 RX ORDER — ONDANSETRON 4 MG/1
4 TABLET, ORALLY DISINTEGRATING ORAL EVERY 30 MIN PRN
Status: DISCONTINUED | OUTPATIENT
Start: 2020-01-23 | End: 2020-01-23 | Stop reason: HOSPADM

## 2020-01-23 RX ORDER — FENTANYL CITRATE 50 UG/ML
INJECTION, SOLUTION INTRAMUSCULAR; INTRAVENOUS PRN
Status: DISCONTINUED | OUTPATIENT
Start: 2020-01-23 | End: 2020-01-23

## 2020-01-23 RX ORDER — LIDOCAINE HYDROCHLORIDE 20 MG/ML
INJECTION, SOLUTION INFILTRATION; PERINEURAL PRN
Status: DISCONTINUED | OUTPATIENT
Start: 2020-01-23 | End: 2020-01-23

## 2020-01-23 RX ORDER — PROPOFOL 10 MG/ML
INJECTION, EMULSION INTRAVENOUS CONTINUOUS PRN
Status: DISCONTINUED | OUTPATIENT
Start: 2020-01-23 | End: 2020-01-23

## 2020-01-23 RX ORDER — SODIUM CHLORIDE, SODIUM LACTATE, POTASSIUM CHLORIDE, CALCIUM CHLORIDE 600; 310; 30; 20 MG/100ML; MG/100ML; MG/100ML; MG/100ML
INJECTION, SOLUTION INTRAVENOUS CONTINUOUS
Status: DISCONTINUED | OUTPATIENT
Start: 2020-01-23 | End: 2020-01-23 | Stop reason: HOSPADM

## 2020-01-23 RX ORDER — FENTANYL CITRATE 50 UG/ML
25-50 INJECTION, SOLUTION INTRAMUSCULAR; INTRAVENOUS
Status: DISCONTINUED | OUTPATIENT
Start: 2020-01-23 | End: 2020-01-23 | Stop reason: HOSPADM

## 2020-01-23 RX ORDER — FENTANYL CITRATE 0.05 MG/ML
25-50 INJECTION, SOLUTION INTRAMUSCULAR; INTRAVENOUS
Status: DISCONTINUED | OUTPATIENT
Start: 2020-01-23 | End: 2020-01-23 | Stop reason: HOSPADM

## 2020-01-23 RX ORDER — FENTANYL CITRATE 50 UG/ML
50-100 INJECTION, SOLUTION INTRAMUSCULAR; INTRAVENOUS
Status: DISCONTINUED | OUTPATIENT
Start: 2020-01-23 | End: 2020-01-23 | Stop reason: HOSPADM

## 2020-01-23 RX ORDER — TETRACAINE HYDROCHLORIDE 5 MG/ML
SOLUTION OPHTHALMIC
Status: DISCONTINUED
Start: 2020-01-23 | End: 2020-01-23 | Stop reason: HOSPADM

## 2020-01-23 RX ORDER — MULTIVITAMIN,THERAPEUTIC
1 TABLET ORAL DAILY
COMMUNITY

## 2020-01-23 RX ORDER — ERYTHROMYCIN 5 MG/G
OINTMENT OPHTHALMIC PRN
Status: DISCONTINUED | OUTPATIENT
Start: 2020-01-23 | End: 2020-01-23 | Stop reason: HOSPADM

## 2020-01-23 RX ADMIN — LIDOCAINE HYDROCHLORIDE 40 MG: 20 INJECTION, SOLUTION INFILTRATION; PERINEURAL at 10:37

## 2020-01-23 RX ADMIN — FENTANYL CITRATE 25 MCG: 50 INJECTION, SOLUTION INTRAMUSCULAR; INTRAVENOUS at 10:44

## 2020-01-23 RX ADMIN — PROPOFOL 20 MG: 10 INJECTION, EMULSION INTRAVENOUS at 10:38

## 2020-01-23 RX ADMIN — ONDANSETRON 4 MG: 2 INJECTION INTRAMUSCULAR; INTRAVENOUS at 10:40

## 2020-01-23 RX ADMIN — SODIUM CHLORIDE, POTASSIUM CHLORIDE, SODIUM LACTATE AND CALCIUM CHLORIDE: 600; 310; 30; 20 INJECTION, SOLUTION INTRAVENOUS at 10:36

## 2020-01-23 RX ADMIN — MIDAZOLAM 2 MG: 1 INJECTION INTRAMUSCULAR; INTRAVENOUS at 10:36

## 2020-01-23 RX ADMIN — FENTANYL CITRATE 50 MCG: 50 INJECTION, SOLUTION INTRAMUSCULAR; INTRAVENOUS at 10:37

## 2020-01-23 RX ADMIN — FENTANYL CITRATE 25 MCG: 50 INJECTION, SOLUTION INTRAMUSCULAR; INTRAVENOUS at 10:54

## 2020-01-23 RX ADMIN — PROPOFOL 100 MCG/KG/MIN: 10 INJECTION, EMULSION INTRAVENOUS at 10:41

## 2020-01-23 RX ADMIN — PROPOFOL 20 MG: 10 INJECTION, EMULSION INTRAVENOUS at 10:41

## 2020-01-23 ASSESSMENT — LIFESTYLE VARIABLES: TOBACCO_USE: 0

## 2020-01-23 ASSESSMENT — MIFFLIN-ST. JEOR: SCORE: 1155.92

## 2020-01-23 ASSESSMENT — COPD QUESTIONNAIRES: COPD: 0

## 2020-01-23 NOTE — OP NOTE
PREOPERATIVE DIAGNOSIS: Bilateral upper eyelid dermatochalasis.   POSTOPERATIVE DIAGNOSIS: Bilateral upper eyelid dermatochalasis.   PROCEDURE: Bilateral upper blepharoplasty.   SURGEON: Lavell Diallo MD.   ASSISTANT: Fatih Olmedo MD   ANESTHESIA: Monitored with local infiltration of 2% lidocaine with epinephrine.   COMPLICATIONS: None.   ESTIMATED BLOOD LOSS: Less than 5 mL.   HISTORY: Carie Ambrocio  presented with upper lid dermatochalasis leading to mechanical ptosis of the upper lids, blocking the superior visual field and interfering with  activities of daily living. After the risks, benefits and alternatives to the proposed procedure were explained, informed consent was obtained.   DESCRIPTION OF PROCEDURE: Carie Ambrocio was brought to the operating room and placed supine on the operating table. IV sedation was given. The upper lid crease and excess upper eyelid skin was marked with marking pen and infiltrated with local anesthetic. The area was prepped and draped in the typical fashion. Attention was directed to the right side. Skin was incised following marked lines. Skin flap was excised with a high temperature cautery. A row of cautery was placed in the orbicularis along the inferior incision line.   The orbicularis and septum were opened nasally. A small amount of the nasal fat pad was excised with the cautery. The orbicularis was divided laterally with the cautery. Multiple 5-0 Monocryl sutures were used to secure the superior edge of orbicularis to the arcus marginalis to support the lateral brow.Hemostasis was obtained and the skin closed with running 6-0 plain gut suture. Attention was directed to the left side where the same procedure was performed.  Ophthalmic antibiotic ointment was applied to the eyelids and into the eyes. Carie Ambrocio tolerated the procedure well and left the operating room in stable condition.     Lavell Diallo MD

## 2020-01-23 NOTE — BRIEF OP NOTE
[facility] Brief Operative Note    Pre-operative diagnosis: Dermatochalasis of both upper eyelids [H02.831, H02.834]   Post-operative diagnosis Same   Procedure: Procedure(s):  BILATERAL UPPER LID BLEPHAROPLASTY   Surgeon(s): Surgeon(s) and Role:     * Lavell Diallo MD - Primary   Estimated blood loss: 2 mL    Specimens: ID Type Source Tests Collected by Time Destination   1 : BILATERAL UPPER EYELID SKIN DISPOSED PER POLICY AND EXCLUSION LIST Tissue Upper Eyelid OR DOCUMENTATION ONLY Lavell Diallo MD 1/23/2020 10:57 AM       Findings: Bilateral upper lid dermatochalasis     Faith Olmedo MD  Oculoplastic Surgery Fellow

## 2020-01-23 NOTE — ANESTHESIA PREPROCEDURE EVALUATION
Anesthesia Pre-Procedure Evaluation    Patient: Carie Ambrocio   MRN: [id number] : 1950          Preoperative Diagnosis: Dermatochalasis of both upper eyelids [H02.831, H02.834]    Procedure(s):  BILATERAL UPPER LID BLEPHAROPLASTY    Past Medical History:   Diagnosis Date     Hyperlipidemia      Hypertension      Insomnia      Migraine      Mumps      Obese      Osteopenia      Spider veins      Squamous cell carcinoma in situ (SCCIS) of skin of right lower leg      Type 2 diabetes mellitus without complications (H)      Uncomplicated asthma      Past Surgical History:   Procedure Laterality Date     GYN SURGERY      hysterectomy and oopherectomy       Anesthesia Evaluation     . Pt has had prior anesthetic. Type: MAC and General    No history of anesthetic complications          ROS/MED HX    ENT/Pulmonary:     (+)asthma , . .   (-) tobacco use, COPD, sleep apnea and recent URI   Neurologic:     (+)migraines,     Cardiovascular:     (+) Dyslipidemia, hypertension----. : . . . :. .      (-) CAD   METS/Exercise Tolerance:     Hematologic:         Musculoskeletal:         GI/Hepatic:        (-) GERD and liver disease   Renal/Genitourinary:      (-) renal disease   Endo:     (+) type II DM Obesity, .   (-) Type I DM   Psychiatric:         Infectious Disease:         Malignancy:         Other:                          Physical Exam  Normal systems: cardiovascular, pulmonary and dental    Airway   Mallampati: II  TM distance: >3 FB  Neck ROM: full    Dental     Cardiovascular       Pulmonary             Lab Results   Component Value Date    WBC 6.9 2016    HGB 12.1 2016    HCT 35.6 2016     2016     2016    POTASSIUM 3.6 2016    CHLORIDE 103 2016    CO2 24 2016    BUN 15 2016    CR 0.66 2016     (H) 2016    SILAS 9.1 2016    MAG 1.6 2016    ALBUMIN 3.6 2016    PROTTOTAL 6.9 2016    ALT 23 2016     "AST 21 05/28/2016    ALKPHOS 57 05/28/2016    BILITOTAL 0.3 05/28/2016       Preop Vitals  BP Readings from Last 3 Encounters:   01/23/20 (!) 146/53   06/08/19 130/70   10/28/17 130/60    Pulse Readings from Last 3 Encounters:   01/23/20 70   06/08/19 88   04/30/18 74      Resp Readings from Last 3 Encounters:   01/23/20 16   06/08/19 16   10/28/17 16    SpO2 Readings from Last 3 Encounters:   01/23/20 98%   04/30/18 98%   05/28/16 98%      Temp Readings from Last 1 Encounters:   01/23/20 36.2  C (97.2  F) (Temporal)    Ht Readings from Last 1 Encounters:   01/23/20 1.626 m (5' 4\")      Wt Readings from Last 1 Encounters:   01/23/20 64.6 kg (142 lb 6.4 oz)    Estimated body mass index is 24.44 kg/m  as calculated from the following:    Height as of this encounter: 1.626 m (5' 4\").    Weight as of this encounter: 64.6 kg (142 lb 6.4 oz).       Anesthesia Plan      History & Physical Review  History and physical reviewed and following examination; no interval change.    ASA Status:  2 .    NPO Status:  > 8 hours    Plan for MAC Reason for MAC:  Procedure to face, neck, head or breast  PONV prophylaxis:  Ondansetron (or other 5HT-3)  Discussed with the patient that the surgeon provides local for analgesia and we provide sedation for anxiolysis. The patient will be awake, breathing on their own, and expected to follow verbal commands. If they feel anxious or too awake they should tell us so we can provide more sedation. If they are having pain they will need to state that they are having pain so the surgeon can provide more local.      Postoperative Care  Postoperative pain management:  Multi-modal analgesia.      Consents  Anesthetic plan, risks, benefits and alternatives discussed with:  Patient..                 Yusuf Simpson MD  "

## 2020-01-23 NOTE — DISCHARGE INSTRUCTIONS
**If you have questions or concerns about your procedure,   call Dr Diallo at [phone](if you see him in [place]) or  [phone] (if you see him at the [facility])**    Same Day Surgery Discharge Instructions for  Sedation and General Anesthesia       It's not unusual to feel dizzy, light-headed or faint for up to 24 hours after surgery or while taking pain medication.  If you have these symptoms: sit for a few minutes before standing and have someone assist you when you get up to walk or use the bathroom.      You should rest and relax for the next 24 hours. We recommend you make arrangements to have an adult stay with you for at least 24 hours after your discharge.  Avoid hazardous and strenuous activity.      DO NOT DRIVE any vehicle or operate mechanical equipment for 24 hours following the end of your surgery.  Even though you may feel normal, your reactions may be affected by the medication you have received.      Do not drink alcoholic beverages for 24 hours following surgery.       Slowly progress to your regular diet as you feel able. It's not unusual to feel nauseated and/or vomit after receiving anesthesia.  If you develop these symptoms, drink clear liquids (apple juice, ginger ale, broth, 7-up, etc. ) until you feel better.  If your nausea and vomiting persists for 24 hours, please notify your surgeon.        All narcotic pain medications, along with inactivity and anesthesia, can cause constipation. Drinking plenty of liquids and increasing fiber intake will help.      For any questions of a medical nature, call your surgeon.      Do not make important decisions for 24 hours.      If you had general anesthesia, you may have a sore throat for a couple of days related to the breathing tube used during surgery.  You may use Cepacol lozenges to help with this discomfort.  If it worsens or if you develop a fever, contact your surgeon.       If you feel your pain is not well managed with the pain  medications prescribed by your surgeon, please contact your surgeon's office to let them know so they can address your concerns.               Same Day Surgery Discharge Instructions for  Sedation and General Anesthesia       It's not unusual to feel dizzy, light-headed or faint for up to 24 hours after surgery or while taking pain medication.  If you have these symptoms: sit for a few minutes before standing and have someone assist you when you get up to walk or use the bathroom.      You should rest and relax for the next 24 hours. We recommend you make arrangements to have an adult stay with you for at least 24 hours after your discharge.  Avoid hazardous and strenuous activity.      DO NOT DRIVE any vehicle or operate mechanical equipment for 24 hours following the end of your surgery.  Even though you may feel normal, your reactions may be affected by the medication you have received.      Do not drink alcoholic beverages for 24 hours following surgery.       Slowly progress to your regular diet as you feel able. It's not unusual to feel nauseated and/or vomit after receiving anesthesia.  If you develop these symptoms, drink clear liquids (apple juice, ginger ale, broth, 7-up, etc. ) until you feel better.  If your nausea and vomiting persists for 24 hours, please notify your surgeon.        All narcotic pain medications, along with inactivity and anesthesia, can cause constipation. Drinking plenty of liquids and increasing fiber intake will help.      For any questions of a medical nature, call your surgeon.      Do not make important decisions for 24 hours.      If you had general anesthesia, you may have a sore throat for a couple of days related to the breathing tube used during surgery.  You may use Cepacol lozenges to help with this discomfort.  If it worsens or if you develop a fever, contact your surgeon.       If you feel your pain is not well managed with the pain medications prescribed by your  surgeon, please contact your surgeon's office to let them know so they can address your concerns.               Post-operative Instructions    Ophthalmic Plastic and Reconstructive Surgery  Lavell Diallo M.D.      All instructions apply to the operated eye(s) or eyelid(s)      What to expect after surgery:    Thre will be some swelling, bruising, and likely a black eye (even into the lower eyelids and cheeks). Also expect crusting and discharge from the eye and/or incisions.     A small amount of surface bleeding is normal for the first 48 hours after surgery.    You may notice some bloody tears for the first few days after surgery. This is normal.    Your eye(s) and eyelid(s) may be painful and tender. This is normal after surgery. Use the pain medication as prescribed. If your pain does not improve despite the medication, contact the office.      Wound care and personal care:    If a patch or bandage has been placed, please leave this in place until seen in clinic. Prevent the bandage from getting wet.     Apply ice compresses 15 minutes on 15 minutes off while awake for the first 2 days after surgery, then switch to warm compresses 4 times a day until seen by your physician.     For warm packs you can place a cup of dry uncooked rice in a clean cotton sock. Place sock in microwave 30 seconds to one minute. Next place the warm sock into a plastic bag and wrap the bag with clean warm wet washcloth and place over operated eye.      You may shower or wash your hair the day after surgery. Do not bathe or go swimming for 1 week to prevent contamination of your wounds.    Do not apply make-up to the eyes or eyelids for 2 weeks after surgery.    Activity restrictions and driving:    Avoid heavy lifting, bending, exercise or strenuous activity for 1 week after surgery.    Sinus Precautions for 1 week: Sneeze with mouth open. Cough with mouth open. No blowing nose. No straws. No bending over.    You may resume other  activities and return to work as tolerated.    You may not resume driving until have you stopped using narcotic pain medications(such as Norco, Percocet, Tylenol #3).    Medications:    Restart all your regular home medications and eye drops today. If you take Plavix or Aspirin on a regular basis, wait for 3 days after your surgery before restarting these in order to decrease the risk of bleeding complications.    Avoid aspirin and aspirin-like medications (Motrin, Aleve, Ibuprofen, Nelia-Douglas etc) for 5 days to reduce the risk of bleeding. You may take Tylenol (acetaminophen) for pain.    In addition to your home medications, take the following post-operative medications as prescribed by your physician:    Apply antibiotic ointment (erythromycin) to all sutures three times a day, and into the operated eye(s) at night.     Take 1 to 2 pain pills (norco or oxycodone as prescribed) as needed for pain up to every 4 hours.    The pain pills may make you drowsy. You must not drive a car, operate heavy machinery or drink alcohol while taking them.    The pain pills may cause constipation and nausea. Take them with some food to prevent a stomach upset. If you continue to experience nausea, call your physician.      WARNING: All the prescription pain medications listed above contain Tylenol (acetaminophen). You must not take more than 4,000 mg of acetaminophen per 24-hour period. This is equivalent to 6 tablets of Darvocet, 8 tablets of Vicodin, or 12 tablets of Norco, Percocet or Tylenol #3. If you take other over-the-counter medications containing acetaminophen, you must take the amount of acetaminophen into account and reduce the number of prescribed pain pills accordingly.    Contact information and follow-up:    Return to the Eye Clinic for a follow-up appointment with your physician as  scheduled. If no appointment has been scheduled, call [phone] for an  appointment with Dr. Diallo within 1 to 2 weeks from  your date of surgery.    For severe pain, bleeding, or loss of vision, call the Eye Clinic at [phone].    An on call person can be reached after hours for concerns. The on call doctor should not call in medication refill requests after hours or on weekends, so please plan accordingly. An effort has been made to provide adequate pain medications following every surgery, and refills will not be provided in most instances. Narcotic pain medications cannot be called in.

## 2020-01-23 NOTE — ANESTHESIA CARE TRANSFER NOTE
Patient: Carie Ambrocio    Procedure(s):  BILATERAL UPPER LID BLEPHAROPLASTY    Diagnosis: Dermatochalasis of both upper eyelids [H02.831, H02.834]  Diagnosis Additional Information: No value filed.    Anesthesia Type:   MAC     Note:  Airway :Room Air  Patient transferred to:PACU  Comments: At end of procedure, spontaneous respirations, patient alert to voice, able to follow commands. Patient breathing room air at room air to PACU. SpO2, NiBP, and EKG monitors and alarms on and functioning, report on patient's clinical status given to PACU RN, RN questions answered.Handoff Report: Identifed the Patient, Identified the Reponsible Provider, Reviewed the pertinent medical history, Discussed the surgical course, Reviewed Intra-OP anesthesia mangement and issues during anesthesia, Set expectations for post-procedure period and Allowed opportunity for questions and acknowledgement of understanding      Vitals: (Last set prior to Anesthesia Care Transfer)    CRNA VITALS  1/23/2020 1034 - 1/23/2020 1108      1/23/2020             Pulse:  78    SpO2:  94 %    Resp Rate (set):  10                Electronically Signed By: MYA Giron CRNA  January 23, 2020  11:08 AM

## 2020-01-23 NOTE — ANESTHESIA POSTPROCEDURE EVALUATION
Patient: Carie Ambrocio    Procedure(s):  BILATERAL UPPER LID BLEPHAROPLASTY    Diagnosis:Dermatochalasis of both upper eyelids [H02.831, H02.834]  Diagnosis Additional Information: No value filed.    Anesthesia Type:  MAC    Note:  Anesthesia Post Evaluation    Patient location during evaluation: PACU  Patient participation: Able to fully participate in evaluation  Level of consciousness: awake and alert  Pain management: adequate  Airway patency: patent  Cardiovascular status: acceptable  Respiratory status: acceptable  Hydration status: acceptable  PONV: none     Anesthetic complications: None          Last vitals:  Vitals:    01/23/20 1115 01/23/20 1130 01/23/20 1209   BP: 124/67 133/65 (!) 141/69   Pulse: 74 73 68   Resp: 17 19 12   Temp:      SpO2: 99% 99% 97%         Electronically Signed By: Yusuf Simpson MD  January 23, 2020  2:22 PM

## 2020-11-24 ENCOUNTER — HOSPITAL ENCOUNTER (OUTPATIENT)
Dept: MAMMOGRAPHY | Facility: CLINIC | Age: 70
Discharge: HOME | End: 2020-11-24
Attending: OBSTETRICS & GYNECOLOGY | Admitting: OBSTETRICS & GYNECOLOGY
Payer: COMMERCIAL

## 2020-11-24 DIAGNOSIS — Z12.31 VISIT FOR SCREENING MAMMOGRAM: ICD-10-CM

## 2020-11-24 PROCEDURE — 77063 BREAST TOMOSYNTHESIS BI: CPT

## 2021-03-13 ENCOUNTER — HEALTH MAINTENANCE LETTER (OUTPATIENT)
Age: 71
End: 2021-03-13

## 2021-10-23 ENCOUNTER — HEALTH MAINTENANCE LETTER (OUTPATIENT)
Age: 71
End: 2021-10-23

## 2021-12-10 ENCOUNTER — HOSPITAL ENCOUNTER (OUTPATIENT)
Dept: MAMMOGRAPHY | Facility: CLINIC | Age: 71
Discharge: HOME | End: 2021-12-10
Attending: OBSTETRICS & GYNECOLOGY | Admitting: OBSTETRICS & GYNECOLOGY
Payer: COMMERCIAL

## 2021-12-10 DIAGNOSIS — Z12.31 BREAST CANCER SCREENING BY MAMMOGRAM: ICD-10-CM

## 2021-12-10 PROCEDURE — 77063 BREAST TOMOSYNTHESIS BI: CPT

## 2022-04-09 ENCOUNTER — HEALTH MAINTENANCE LETTER (OUTPATIENT)
Age: 72
End: 2022-04-09

## 2022-08-18 ENCOUNTER — LAB REQUISITION (OUTPATIENT)
Dept: LAB | Facility: CLINIC | Age: 72
End: 2022-08-18
Payer: COMMERCIAL

## 2022-08-18 DIAGNOSIS — L08.9 LOCAL INFECTION OF THE SKIN AND SUBCUTANEOUS TISSUE, UNSPECIFIED: ICD-10-CM

## 2022-08-18 PROCEDURE — 87070 CULTURE OTHR SPECIMN AEROBIC: CPT | Mod: ORL | Performed by: DERMATOLOGY

## 2022-08-24 LAB
BACTERIA WND CULT: ABNORMAL

## 2022-10-09 ENCOUNTER — HEALTH MAINTENANCE LETTER (OUTPATIENT)
Age: 72
End: 2022-10-09

## 2022-12-12 ENCOUNTER — HOSPITAL ENCOUNTER (OUTPATIENT)
Dept: MAMMOGRAPHY | Facility: CLINIC | Age: 72
Discharge: HOME | End: 2022-12-12
Attending: OBSTETRICS & GYNECOLOGY | Admitting: OBSTETRICS & GYNECOLOGY
Payer: COMMERCIAL

## 2022-12-12 DIAGNOSIS — Z12.31 VISIT FOR SCREENING MAMMOGRAM: ICD-10-CM

## 2022-12-12 PROCEDURE — 77067 SCR MAMMO BI INCL CAD: CPT

## 2023-05-21 ENCOUNTER — HEALTH MAINTENANCE LETTER (OUTPATIENT)
Age: 73
End: 2023-05-21

## 2023-12-14 ENCOUNTER — HOSPITAL ENCOUNTER (OUTPATIENT)
Dept: MAMMOGRAPHY | Facility: CLINIC | Age: 73
Discharge: HOME | End: 2023-12-14
Attending: FAMILY MEDICINE | Admitting: FAMILY MEDICINE
Payer: COMMERCIAL

## 2023-12-14 DIAGNOSIS — Z12.31 VISIT FOR SCREENING MAMMOGRAM: ICD-10-CM

## 2023-12-14 PROCEDURE — 77067 SCR MAMMO BI INCL CAD: CPT

## 2024-12-17 ENCOUNTER — HOSPITAL ENCOUNTER (OUTPATIENT)
Dept: MAMMOGRAPHY | Facility: CLINIC | Age: 74
Discharge: HOME | End: 2024-12-17
Attending: OBSTETRICS & GYNECOLOGY
Payer: COMMERCIAL

## 2024-12-17 DIAGNOSIS — Z12.31 SCREENING MAMMOGRAM FOR BREAST CANCER: ICD-10-CM

## 2024-12-17 PROCEDURE — 77067 SCR MAMMO BI INCL CAD: CPT

## 2024-12-17 PROCEDURE — 77063 BREAST TOMOSYNTHESIS BI: CPT

## 2025-06-14 ENCOUNTER — HEALTH MAINTENANCE LETTER (OUTPATIENT)
Age: 75
End: 2025-06-14

## (undated) DEVICE — SU PLAIN 6-0 G-1DA 18" 770G

## (undated) DEVICE — ESU NDL COLORADO MICRO 3CM STR N103A

## (undated) DEVICE — ESU PENCIL W/SMOKE EVAC CVPLP2000

## (undated) DEVICE — SOL WATER IRRIG 1000ML BOTTLE 2F7114

## (undated) DEVICE — LINEN TOWEL PACK X5 5464

## (undated) DEVICE — SU MONOCRYL 5-0 P-3 18" UND Y493G

## (undated) DEVICE — ESU EYE HIGH TEMP 65410-183

## (undated) DEVICE — GLOVE PROTEXIS W/NEU-THERA 7.5  2D73TE75

## (undated) DEVICE — EYE PREP BETADINE 5% SOLUTION 30ML 0065-0411-30

## (undated) DEVICE — GLOVE PROTEXIS MICRO 7.5  2D73PM75

## (undated) DEVICE — PACK OCULOPLATIC SEN15OCFSD

## (undated) RX ORDER — FENTANYL CITRATE 50 UG/ML
INJECTION, SOLUTION INTRAMUSCULAR; INTRAVENOUS
Status: DISPENSED
Start: 2020-01-23